# Patient Record
Sex: FEMALE | NOT HISPANIC OR LATINO | Employment: PART TIME | URBAN - METROPOLITAN AREA
[De-identification: names, ages, dates, MRNs, and addresses within clinical notes are randomized per-mention and may not be internally consistent; named-entity substitution may affect disease eponyms.]

---
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
## 2022-09-08 ENCOUNTER — OFFICE VISIT (OUTPATIENT)
Dept: URGENT CARE | Facility: URGENT CARE | Age: 22
End: 2022-09-08
Payer: COMMERCIAL

## 2022-09-08 VITALS
HEART RATE: 77 BPM | OXYGEN SATURATION: 98 % | TEMPERATURE: 97 F | DIASTOLIC BLOOD PRESSURE: 79 MMHG | WEIGHT: 140 LBS | BODY MASS INDEX: 23.3 KG/M2 | SYSTOLIC BLOOD PRESSURE: 129 MMHG

## 2022-09-08 DIAGNOSIS — L03.213 PRESEPTAL CELLULITIS OF RIGHT LOWER EYELID: Primary | ICD-10-CM

## 2022-09-08 DIAGNOSIS — H00.012 HORDEOLUM EXTERNUM OF RIGHT LOWER EYELID: ICD-10-CM

## 2022-09-08 PROCEDURE — 99203 OFFICE O/P NEW LOW 30 MIN: CPT | Performed by: PHYSICIAN ASSISTANT

## 2022-09-08 RX ORDER — ERYTHROMYCIN 5 MG/G
0.5 OINTMENT OPHTHALMIC 4 TIMES DAILY
Qty: 3.5 G | Refills: 0 | Status: SHIPPED | OUTPATIENT
Start: 2022-09-08 | End: 2022-09-15

## 2022-09-08 ASSESSMENT — ENCOUNTER SYMPTOMS
SORE THROAT: 0
SHORTNESS OF BREATH: 0
WHEEZING: 0
CONSTITUTIONAL NEGATIVE: 1
EYE ITCHING: 0
GASTROINTESTINAL NEGATIVE: 1
PHOTOPHOBIA: 0
EYE REDNESS: 0
PALPITATIONS: 0
SINUS PRESSURE: 0
COUGH: 0
RESPIRATORY NEGATIVE: 1
RHINORRHEA: 1
SINUS PAIN: 0
FATIGUE: 0
EYE DISCHARGE: 0
CARDIOVASCULAR NEGATIVE: 1
CHILLS: 0
FEVER: 0
EYE PAIN: 0

## 2022-09-08 ASSESSMENT — VISUAL ACUITY: OU: 1

## 2022-09-08 NOTE — PROGRESS NOTES
Subjective   Caryn is a 21 year old, presenting for the following health issues:  Eye Problem (Right eye swelling, bruising, pain, and redness. Onset- Wednesday (1 day))    HPI   Eye(s) Problem  Onset/Duration: 2days  Description:   Location: R lower eyelid  Pain: YES  Redness: YES  Accompanying Signs & Symptoms:  Discharge/mattering: No  Swelling: YES  Visual changes: No  Fever: No  Nasal Congestion: No  Bothered by bright lights: No  History:  Trauma: No  Foreign body exposure: No  Wearing contacts: No  Precipitating or alleviating factors: None  Therapies tried and outcome: benadryl, warm packs, eye drops with minimal relief    Patient Active Problem List   Diagnosis     Major depressive disorder, recurrent episode, moderate (H)     Unspecified anxiety disorder     Unspecified depressive disorder     Unspecified trauma- and stressor-related disorder     Cannabis use disorder, severe     Vitamin D insufficiency     Current Outpatient Medications   Medication     MedroxyPROGESTERone Acetate (DEPO-PROVERA IM)     sertraline (ZOLOFT) 100 MG tablet     No current facility-administered medications for this visit.      No Known Allergies    Review of Systems   Constitutional: Negative.  Negative for chills, fatigue and fever.   HENT: Positive for congestion and rhinorrhea. Negative for ear discharge, ear pain, hearing loss, sinus pressure, sinus pain and sore throat.    Eyes: Negative for photophobia, pain, discharge, redness, itching and visual disturbance.   Respiratory: Negative.  Negative for cough, shortness of breath and wheezing.    Cardiovascular: Negative.  Negative for chest pain, palpitations and peripheral edema.   Gastrointestinal: Negative.    All other systems reviewed and are negative.           Objective    /79 (BP Location: Left arm, Patient Position: Sitting, Cuff Size: Adult Regular)   Pulse 77   Temp 97  F (36.1  C) (Tympanic)   Wt 63.5 kg (140 lb)   SpO2 98%   BMI 23.30 kg/m    Body  mass index is 23.3 kg/m .  Physical Exam  Vitals and nursing note reviewed.   Constitutional:       General: She is not in acute distress.     Appearance: Normal appearance. She is well-developed and normal weight. She is not ill-appearing.   HENT:      Head: Normocephalic and atraumatic. Right periorbital erythema present. No raccoon eyes, Anderson's sign, contusion or left periorbital erythema.      Ears:      Comments: TMs are intact without any erythema or bulging bilaterally.  Airway is patent.     Nose: Nose normal.      Mouth/Throat:      Lips: Pink.      Mouth: Mucous membranes are moist.      Pharynx: Oropharynx is clear. Uvula midline. No pharyngeal swelling, oropharyngeal exudate or posterior oropharyngeal erythema.      Tonsils: No tonsillar exudate.   Eyes:      General: Lids are normal. Lids are everted, no foreign bodies appreciated. Vision grossly intact. Gaze aligned appropriately. No scleral icterus.        Right eye: Hordeolum present. No foreign body or discharge.         Left eye: No foreign body, discharge or hordeolum.      Extraocular Movements: Extraocular movements intact.      Conjunctiva/sclera: Conjunctivae normal.      Right eye: Right conjunctiva is not injected. No chemosis or hemorrhage.     Left eye: Left conjunctiva is not injected. No chemosis or hemorrhage.     Pupils: Pupils are equal, round, and reactive to light.   Neck:      Thyroid: No thyromegaly.   Cardiovascular:      Rate and Rhythm: Normal rate and regular rhythm.      Pulses: Normal pulses.      Heart sounds: Normal heart sounds, S1 normal and S2 normal. No murmur heard.    No friction rub. No gallop.   Pulmonary:      Effort: Pulmonary effort is normal. No accessory muscle usage, respiratory distress or retractions.      Breath sounds: Normal breath sounds and air entry. No decreased breath sounds, wheezing, rhonchi or rales.   Musculoskeletal:      Cervical back: Normal range of motion and neck supple.    Lymphadenopathy:      Cervical: No cervical adenopathy.   Skin:     General: Skin is warm and dry.      Findings: No rash.   Neurological:      Mental Status: She is alert and oriented to person, place, and time.   Psychiatric:         Mood and Affect: Mood normal.         Behavior: Behavior normal.         Thought Content: Thought content normal.         Judgment: Judgment normal.          Assessment/Plan:  Preseptal cellulitis of right lower eyelid:  Will treat with [id number] and take with food/probiotics to minimize GI upset.  Recommend tylenol/ibuprofen prn pain/fever and warm compresses.   Rest, fluids, chicken soup.  Recheck in clinic if symptoms worsen or if symptoms do not improve.    -     amoxicillin-clavulanate (AUGMENTIN) 875-125 MG tablet; Take 1 tablet by mouth 2 times daily for 10 days    Hordeolum externum of right lower eyelid:  Will give erythromycin ointment as directed.  Will send to ophthamology if no improvement.  -     erythromycin (ROMYCIN) 5 MG/GM ophthalmic ointment; Apply 0.5 inches to eye 4 times daily for 7 days        Noa Borjas PA-C

## 2023-04-11 ENCOUNTER — OFFICE VISIT (OUTPATIENT)
Dept: FAMILY MEDICINE | Facility: CLINIC | Age: 23
End: 2023-04-11
Payer: COMMERCIAL

## 2023-04-11 ENCOUNTER — TELEPHONE (OUTPATIENT)
Dept: URGENT CARE | Facility: URGENT CARE | Age: 23
End: 2023-04-11

## 2023-04-11 VITALS
BODY MASS INDEX: 22.47 KG/M2 | DIASTOLIC BLOOD PRESSURE: 74 MMHG | HEART RATE: 88 BPM | SYSTOLIC BLOOD PRESSURE: 109 MMHG | WEIGHT: 135 LBS | OXYGEN SATURATION: 96 % | TEMPERATURE: 97.8 F | RESPIRATION RATE: 16 BRPM

## 2023-04-11 DIAGNOSIS — B96.89 BV (BACTERIAL VAGINOSIS): ICD-10-CM

## 2023-04-11 DIAGNOSIS — N75.0 BARTHOLIN CYST: Primary | ICD-10-CM

## 2023-04-11 DIAGNOSIS — Z11.3 SCREEN FOR STD (SEXUALLY TRANSMITTED DISEASE): ICD-10-CM

## 2023-04-11 DIAGNOSIS — N76.0 BV (BACTERIAL VAGINOSIS): ICD-10-CM

## 2023-04-11 LAB
CLUE CELLS: PRESENT
TRICHOMONAS, WET PREP: ABNORMAL
WBC'S/HIGH POWER FIELD, WET PREP: ABNORMAL
YEAST, WET PREP: ABNORMAL

## 2023-04-11 PROCEDURE — 36415 COLL VENOUS BLD VENIPUNCTURE: CPT | Performed by: PHYSICIAN ASSISTANT

## 2023-04-11 PROCEDURE — 99214 OFFICE O/P EST MOD 30 MIN: CPT | Performed by: PHYSICIAN ASSISTANT

## 2023-04-11 PROCEDURE — 87389 HIV-1 AG W/HIV-1&-2 AB AG IA: CPT | Performed by: PHYSICIAN ASSISTANT

## 2023-04-11 PROCEDURE — 87210 SMEAR WET MOUNT SALINE/INK: CPT | Performed by: PHYSICIAN ASSISTANT

## 2023-04-11 PROCEDURE — 87491 CHLMYD TRACH DNA AMP PROBE: CPT | Performed by: PHYSICIAN ASSISTANT

## 2023-04-11 PROCEDURE — 86803 HEPATITIS C AB TEST: CPT | Performed by: PHYSICIAN ASSISTANT

## 2023-04-11 PROCEDURE — 87591 N.GONORRHOEAE DNA AMP PROB: CPT | Performed by: PHYSICIAN ASSISTANT

## 2023-04-11 PROCEDURE — 86780 TREPONEMA PALLIDUM: CPT | Performed by: PHYSICIAN ASSISTANT

## 2023-04-11 RX ORDER — CLINDAMYCIN HCL 300 MG
300 CAPSULE ORAL 3 TIMES DAILY
Qty: 21 CAPSULE | Refills: 0 | Status: SHIPPED | OUTPATIENT
Start: 2023-04-11 | End: 2023-04-18

## 2023-04-12 ENCOUNTER — TELEPHONE (OUTPATIENT)
Dept: FAMILY MEDICINE | Facility: CLINIC | Age: 23
End: 2023-04-12
Payer: COMMERCIAL

## 2023-04-12 LAB
C TRACH DNA SPEC QL PROBE+SIG AMP: NEGATIVE
HCV AB SERPL QL IA: NONREACTIVE
HIV 1+2 AB+HIV1 P24 AG SERPL QL IA: NONREACTIVE
N GONORRHOEA DNA SPEC QL NAA+PROBE: NEGATIVE
T PALLIDUM AB SER QL: NONREACTIVE

## 2023-04-12 NOTE — PROGRESS NOTES
Patient presents with:  Vaginal Problem: Started and seen at [facility] x December 12/08/22. Off and on assess Rt side.      Clinical Decision Making:  Patient was informed that draining Bartholin cyst is outside of the scope of care for her urgent care as we do not have Word catheters and it is likely to recur when the procedure is done here.  Recommend sitz bath's frequently.  Patient was referred to OB/GYN and given phone number for [facility]. STD testing in process.  Patient prescribed Tylenol 3 for breakthrough pain.  Wet prep was positive, so I switched antibiotic from Augmentin to clindamycin for better coverage for both skin bacteria and BV.    ICD-10-CM    1. Bartholin cyst  N75.0 Ob/Gyn Referral     acetaminophen-codeine (TYLENOL #3) 300-30 MG tablet     clindamycin (CLEOCIN) 300 MG capsule     DISCONTINUED: amoxicillin-clavulanate (AUGMENTIN) 875-125 MG tablet      2. Screen for STD (sexually transmitted disease)  Z11.3 Wet preparation     Treponema Abs w Reflex to RPR and Titer     Hepatitis C antibody     HIV Antigen Antibody Combo     Chlamydia & Gonorrhea by PCR, [facility] - Clinic Collect      3. BV (bacterial vaginosis)  N76.0 clindamycin (CLEOCIN) 300 MG capsule    B96.89           Patient Instructions   1. Soak in a tub nightly. Apply warm compresses if soaking isn't an option.   2. Continue taking Ibuprofen. Take Tylenol #3 for breakthrough pain. Do not exceed 2400 mg of Ibuprofen in a 24 hour period.   3. Follow up with [facility] for further care and potential drainage of this cyst.           HPI:  Caryn Cardenas is a 22 year old female who presents today complaining of lump on the right side of the vagina that was starting in December.  She was previously seen at [facility] and it was drained and a catheter was put in to keep it open, but it has refilled.  No fevers or chills.  It has not been actively draining.  Is very painful and she been taking ibuprofen, but the ibuprofen is not  helpful with pain control.    History obtained from the patient.    Problem List:  2019-01: Unspecified trauma- and stressor-related disorder  2019-01: Cannabis use disorder, severe  2019-01: Vitamin D insufficiency  2017-05: Unspecified depressive disorder  2017-03: Major depressive disorder, recurrent episode, moderate (H)  2017-03: Unspecified anxiety disorder      Past Medical History:   Diagnosis Date     Depressive disorder        Social History     Tobacco Use     Smoking status: Never     Smokeless tobacco: Never   Vaping Use     Vaping status: Not on file   Substance Use Topics     Alcohol use: No       Review of Systems    Vitals:    04/11/23 1928   BP: 109/74   Pulse: 88   Resp: 16   Temp: 97.8  F (36.6  C)   TempSrc: Oral   SpO2: 96%   Weight: 61.2 kg (135 lb)       Physical Exam  Vitals and nursing note reviewed.   Constitutional:       General: She is not in acute distress.     Appearance: She is not toxic-appearing or diaphoretic.   HENT:      Head: Normocephalic and atraumatic.      Right Ear: External ear normal.      Left Ear: External ear normal.   Eyes:      Conjunctiva/sclera: Conjunctivae normal.   Pulmonary:      Effort: Pulmonary effort is normal. No respiratory distress.   Genitourinary:     Comments: Bartholin cyst on right side. TTP. No active drainage.   Neurological:      Mental Status: She is alert.   Psychiatric:         Mood and Affect: Mood normal.         Behavior: Behavior normal.         Thought Content: Thought content normal.         Judgment: Judgment normal.         At the end of the encounter, I discussed results, diagnosis, medications. Discussed red flags for immediate return to clinic/ER, as well as indications for follow up if no improvement. Patient understood and agreed to plan. Patient was stable for discharge.

## 2023-04-12 NOTE — PATIENT INSTRUCTIONS
Soak in a tub nightly. Apply warm compresses if soaking isn't an option.   Continue taking Ibuprofen. Take Tylenol #3 for breakthrough pain. Do not exceed 2400 mg of Ibuprofen in a 24 hour period.   Follow up with [facility] for further care and potential drainage of this cyst.

## 2023-04-12 NOTE — TELEPHONE ENCOUNTER
Left a message to call back.  Patient's wet prep was positive for bacterial vaginosis.  Rather than starting her on Augmentin had like to switch her to Clindamycin which will have better coverage for both bacterial vaginosis and skin infections.    See information below regarding bacterial vaginosis    1. You currently have a vaginal infection called bacterial vaginosis, BV for short.   2. This is not a sexually transmitted infection and it cannot be transmitted to your partner.  3. BV typically occurs due to a change in pH balance of the vagina. The following things may cause BV to occur: douching, new soaps or lubricants, or oral sex. Sometimes we can't prevent BV because it can happen for no reason at all.   4. Sometimes BV is asymptomatic, but classically it causes thin or creamy odorous vaginal discharge. It can also cause some low abdominal discomfort.   5. Today we will treat this infection with a antibiotic called Clindamycin. Take this medication three times a day for 7 days.

## 2023-04-12 NOTE — TELEPHONE ENCOUNTER
----- Message from Cristina Valdez PA-C sent at 4/12/2023  3:02 PM CDT -----  Team - please call patient with results.  Patient's STD testing was all negative.  I left a message earlier telling her to call back because her wet prep was positive for BV.  If you get a hold of her please explain the following message.    You currently have a vaginal infection called bacterial vaginosis, BV for short.   This is not a sexually transmitted infection and it cannot be transmitted to your partner.  BV typically occurs due to a change in pH balance of the vagina. The following things may cause BV to occur: douching, new soaps or lubricants, or oral sex. Sometimes we can't prevent BV because it can happen for no reason at all.   Sometimes BV is asymptomatic, but classically it causes thin or creamy odorous vaginal discharge. It can also cause some low abdominal discomfort.   Today we will treat this infection with a antibiotic called Clindamycin. Clindamycin has coverage against skin infections and BV, so it should also be helpful for your Bartholin cyst.

## 2023-04-12 NOTE — TELEPHONE ENCOUNTER
Called, no answer. Did not leave voicemail as provider who called had already left one earlier today. Please call patient again to relay provider's message regarding wet prep results.    Annmarie Shukla MA on 4/12/2023 at 5:16 PM

## 2023-04-12 NOTE — LETTER
April 14, 2023      Caryn Cardenas  [address]  [place] [address]        Dear Ms.Cardenas,    We are writing to inform you of your test results.    Your wet prep came back positive for a vaginal infection called Bacterial Vaginosis, BV for short. This is not a sexually transmitted infection and can not be transmitted to your partner. BV typically occurs due to a change in pH balance of the vagina. The following things may cause BV to occur: douching, use of new soap or lubricants, or oral sex. Sometimes we can't prevent BV because it can happen for no reason at all. Sometimes BV is asymptomatic, but classically it causes a thing or creamy odorous vaginal discharge. It can also cause some low abdominal discomfort. We will treat this infection with an antibiotic called Clindamycin which covers against skin infections and BV which also should be helpful for your Bartholin Cyst. [facility] [place] [facility] [address].    Resulted Orders   Wet preparation   Result Value Ref [facility]    Trichomonas Absent Absent    Yeast Absent Absent    Clue Cells Present (A) Absent    WBCs/high power field 1+ (A) None   Treponema Abs w Reflex to RPR and Titer   Result Value Ref [facility]    Treponema Antibody Total Nonreactive Nonreactive   Hepatitis C antibody   Result Value Ref [facility]    Hepatitis C Antibody Nonreactive Nonreactive    Narrative    Assay performance characteristics have not been established for newborns, infants, and children.   HIV Antigen Antibody Combo   Result Value Ref [facility]    HIV Antigen Antibody Combo Nonreactive Nonreactive      Comment:      HIV-1 p24 Ag & HIV-1/HIV-2 Ab Not Detected   Chlamydia & Gonorrhea by PCR, [facility] - Clinic Collect   Result Value Ref [facility]    Chlamydia Trachomatis Negative Negative      Comment:      Negative for C. trachomatis rRNA by transcription mediated amplification.   A negative result by transcription mediated  amplification does not preclude the presence of infection because results are dependent on proper and adequate collection, absence of inhibitors and sufficient rRNA to be detected.    Neisseria gonorrhoeae Negative Negative      Comment:      Negative for N. gonorrhoeae rRNA by transcription mediated amplification. A negative result by transcription mediated amplification does not preclude the presence of C. trachomatis infection because results are dependent on proper and adequate collection, absence of inhibitors and sufficient rRNA to be detected.       If you have any questions or concerns, please call the clinic at the number listed above.       Sincerely,      [facility] Walk-In Staff

## 2023-04-13 NOTE — TELEPHONE ENCOUNTER
Called patient, no answer. Left another voicemail providing call back number. If no call back, please call again to relay message to patient.    Annmarie Shukla MA on 4/13/2023 at 3:06 PM